# Patient Record
Sex: FEMALE | Race: WHITE
[De-identification: names, ages, dates, MRNs, and addresses within clinical notes are randomized per-mention and may not be internally consistent; named-entity substitution may affect disease eponyms.]

---

## 2020-03-02 ENCOUNTER — HOSPITAL ENCOUNTER (EMERGENCY)
Dept: HOSPITAL 97 - ER | Age: 70
LOS: 1 days | Discharge: HOME | End: 2020-03-03
Payer: COMMERCIAL

## 2020-03-02 DIAGNOSIS — S52.612A: ICD-10-CM
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
## 2020-03-02 DIAGNOSIS — Z88.5: ICD-10-CM
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
## 2020-03-02 DIAGNOSIS — S52.352A: Primary | ICD-10-CM
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
## 2020-03-02 PROCEDURE — 25415 REPAIR RADIUS & ULNA: CPT

## 2020-03-02 PROCEDURE — 96375 TX/PRO/DX INJ NEW DRUG ADDON: CPT

## 2020-03-02 PROCEDURE — 73110 X-RAY EXAM OF WRIST: CPT

## 2020-03-02 PROCEDURE — 96374 THER/PROPH/DIAG INJ IV PUSH: CPT

## 2020-03-02 PROCEDURE — 99285 EMERGENCY DEPT VISIT HI MDM: CPT

## 2020-03-02 PROCEDURE — 73100 X-RAY EXAM OF WRIST: CPT

## 2020-03-03 VITALS — DIASTOLIC BLOOD PRESSURE: 64 MMHG | SYSTOLIC BLOOD PRESSURE: 150 MMHG | OXYGEN SATURATION: 97 %

## 2020-03-03 VITALS — TEMPERATURE: 98.7 F

## 2020-03-03 PROCEDURE — 0PSLXZZ REPOSITION LEFT ULNA, EXTERNAL APPROACH: ICD-10-PCS

## 2020-03-03 PROCEDURE — 0PSJXZZ REPOSITION LEFT RADIUS, EXTERNAL APPROACH: ICD-10-PCS

## 2020-03-03 NOTE — ER
Nurse's Notes                                                                                     

 [facility]                                                                 

Name: Lianne Martinez                                                                                  

Age: 70 yrs                                                                                       

Sex: Female                                                                                       

: 1950                                                                                   

MRN: [id number]                                                                                   

Arrival Date: 2020                                                                          

Time: 20:05                                                                                       

Account#: [id number]                                                                            

Bed 3                                                                                             

Private MD:                                                                                       

Diagnosis: Displaced comminuted fracture of shaft of radius, left arm;Fracture of ulna styloid    

  process                                                                                         

                                                                                                  

Presentation:                                                                                     

                                                                                             

20:15 Chief complaint: Spouse and/or significant other states: She was stepping off the side  jb4 

      walk onto the ground and her ankle gave out and she fell on her wrist.                      

20:15 Coronavirus screen: The patient has NOT traveled to [place] in the past 14 days. Proceed  jb4 

      with normal triage procedures. The patient has NOT had contact with known and/or            

      suspected case of Coronavirus. Proceed with normal triage procedures. Ebola Screen: No      

      symptoms or risks identified at this time. Initial Sepsis Screen: Does the patient meet     

      any 2 criteria? No. Patient's initial sepsis screen is negative. Does the patient have      

      a suspected source of infection? No. Patient's initial sepsis screen is negative. Risk      

      Assessment: Do you want to hurt yourself or someone else? Patient reports no desire to      

      harm self or others.                                                                        

20:15 Method Of Arrival: Ambulatory                                                           jb4 

20:15 Acuity: NILAM 3                                                                           jb4 

                                                                                                  

Historical:                                                                                       

- Allergies:                                                                                      

20:15 Codeine;                                                                                jb4 

- Home Meds:                                                                                      

20:15 Lisinopril Oral [Active];                                                               jb4 

- PMHx:                                                                                           

20:15 None;                                                                                   jb4 

- PSHx:                                                                                           

20:15 ;                                                                              jb4 

                                                                                                  

- Immunization history:: Adult Immunizations up to date, Flu vaccine is not up to date.           

- Social history:: Smoking status: Patient denies any tobacco usage or history of.                

  Patient/guardian denies using alcohol, street drugs.                                            

- Family history:: not pertinent.                                                                 

- Hospitalizations: : No recent hospitalization is reported.                                      

                                                                                                  

                                                                                                  

Screenin:45 Abuse screen: Denies threats or abuse. Denies injuries from another. Nutritional        rr5 

      screening: No deficits noted. Tuberculosis screening: No symptoms or risk factors           

      identified. Fall Risk IV access (20 points).                                                

                                                                                                  

Assessment:                                                                                       

20:15 General: Appears in no apparent distress. uncomfortable, Behavior is calm, cooperative, jb4 

      appropriate for age. Pain: Complains of pain in left wrist Pain radiates to palmar          

      aspect of left forearm Pain currently is 10 out of 10 on a pain scale. Quality of pain      

      is described as throbbing. Neuro: Level of Consciousness is awake, alert, obeys             

      commands, Oriented to person, place, time, situation. Cardiovascular: Capillary refill      

      < 3 seconds in left fingers Patient's skin is warm and dry. Respiratory: Airway is          

      patent Respiratory effort is even, unlabored, Respiratory pattern is regular,               

      symmetrical. GI: No signs and/or symptoms were reported involving the gastrointestinal      

      system. : No signs and/or symptoms were reported regarding the genitourinary system.      

      EENT: No signs and/or symptoms were reported regarding the EENT system. Derm: Skin is       

      intact, Skin is pink, warm \T\ dry. Musculoskeletal: Range of motion: limited in left       

      wrist.                                                                                      

21:15 Reassessment: Patient appears in no apparent distress at this time. Patient and/or      jb4 

      family updated on plan of care and expected duration. Pain level reassessed. Patient is     

      alert, oriented x 3, equal unlabored respirations, skin warm/dry/pink. Patient states       

      feeling better.                                                                             

22:13 Reassessment: Patient appears in no apparent distress at this time. Patient and/or      jb4 

      family updated on plan of care and expected duration. Pain level reassessed. Patient is     

      alert, oriented x 3, equal unlabored respirations, skin warm/dry/pink. Pt reports pain      

      is decreased but starting to come back. Conscious sedation consent form signed.             

23:00 Reassessment: Patient appears in no apparent distress at this time. Patient and/or      jb4 

      family updated on plan of care and expected duration. Pain level reassessed. Patient is     

      alert, oriented x 3, equal unlabored respirations, skin warm/dry/pink. Conscious            

      sedation started, see flow sheet for further documentation.                                 

                                                                                             

00:00 Reassessment: Patient appears in no apparent distress at this time. Patient and/or      jb4 

      family updated on plan of care and expected duration. Pain level reassessed. Patient is     

      alert, oriented x 3, equal unlabored respirations, skin warm/dry/pink.                      

01:00 Reassessment: Patient appears in no apparent distress at this time. Patient and/or      jb4 

      family updated on plan of care and expected duration. Pain level reassessed. Patient is     

      alert, oriented x 3, equal unlabored respirations, skin warm/dry/pink.                      

01:45 Reassessment: Patient appears in no apparent distress at this time. Patient and/or      jb4 

      family updated on plan of care and expected duration. Pain level reassessed. Patient is     

      alert, oriented x 3, equal unlabored respirations, skin warm/dry/pink. Pt reports           

      nausea, provider notified, see MAR for orders.                                              

01:50 Reassessment: Pt and family, verbalized understanding of d/c and follow up              jb4 

      instructions. Assisted out of ED via wheelchair. Cap refill <3 seconds in left fingers.     

      reports pain has decreased. Assisted to vehicle via wheelchair.                             

                                                                                                  

Vital Signs:                                                                                      

                                                                                             

20:15  / 95; Pulse 90; Resp 16; Pulse Ox 97% on R/A; Weight 86.18 kg (R); Height 5 ft.  jb4 

      6 in. (167.64 cm) (R); Pain 10/10;                                                          

21:00  / 70; Pulse 90; Resp 16; Pulse Ox 96% on R/A;                                    jb4 

22:14  / 77; Pulse 101; Resp 16; Temp 98.4(O); Pulse Ox 97% on R/A; Pain 7/10;          jb4 

23:00  / 78; Pulse 107; Resp 16; Temp 98.7; Pulse Ox 96% on R/A;                        jb4 

                                                                                             

00:00  / 64; Pulse 78; Resp 12; Pulse Ox 97% on R/A;                                    jb4 

                                                                                             

20:15 Body Mass Index 30.67 (86.18 kg, 167.64 cm)                                             jb4 

                                                                                             

23:00 Conscious sedation started, see flow sheet for further documentation.                   jb4 

                                                                                                  

ED Course:                                                                                        

20:05 Patient arrived in ED.                                                                  jg7 

20:12 Sterling Polk MD is Attending Physician.                                                rn  

20:15 Arm band placed on right wrist.                                                         jb4 

20:15 Patient has correct armband on for positive identification. Bed in low position. Call   jb4 

      light in reach. Side rails up X 1. Pulse ox on. NIBP on.                                    

20:26 Reggie Fowler, RN is Primary Nurse.                                                     jb4 

20:29 XRAY Wrist LEFT 3 view In Process Unspecified.                                          EDMS

20:30 Triage completed.                                                                       jb4 

20:40 Inserted saline lock: 22 gauge in right forearm, using aseptic technique. ,using        rr5 

      aseptic technique. inserted by mohit Baeza.                                                    

22:14 Ice pack to injury.                                                                     jb4 

23:35 Assist provider with fracture care of left wrist Fracture is closed. Obvious deformity  jb4 

      is noted. Circulation, motor and sensation is intact. Set up for procedure. Performed       

      by Sterling Polk MD Reduced with physical manipulation. Immobilized with Post                 

      immobilization, circulation, motor and sensation remain intact. Patient tolerated well.     

23:51 XRAY Wrist LEFT 2 view In Process Unspecified.                                          EDMS

                                                                                             

01:15 Prakash Dorman MD is Referral Physician.                                                rn  

01:40 IV discontinued, intact, bleeding controlled, No redness/swelling at site. Pressure     jb4 

      dressing applied.                                                                           

                                                                                                  

Administered Medications:                                                                         

                                                                                             

21:00 Drug: Zofran (Ondansetron) 4 mg Route: IVP; Site: right forearm;                        rr5 

21:30 Follow up: Response: No adverse reaction                                                [facility] 

21:02 Drug: morphine 2 mg {Note: rass 0 .} Route: IVP; Site: right forearm;                   rr5 

21:30 Follow up: Response: No adverse reaction; Pain is decreased; RASS: Alert and Calm (0)   [facility] 

21:05 Drug: Pepcid 20 mg Route: IVP; Site: right forearm;                                     rr5 

21:30 Follow up: Response: No adverse reaction                                                [facility] 

23:10 Drug: Propofol 100 mg Route: IVP; Site: right antecubital;                              [facility] 

                                                                                             

00:50 Follow up: Response: No adverse reaction                                                [facility] 

                                                                                             

23:20 Drug: Propofol 100 mg Route: IVP; Site: right antecubital;                              [facility] 

                                                                                             

00:50 Follow up: Response: No adverse reaction                                                [facility] 

                                                                                             

23:25 Drug: fentaNYL (PF) 50 mcg {Note: rass score -1 due to conscious sedation..} Route:     jb4 

      IVP; Site: right antecubital;                                                               

                                                                                             

00:50 Follow up: Response: No adverse reaction; Pain is decreased; RASS: Alert and Calm (0)   [facility] 

01:30 Drug: morphine 4 mg {Note: Rass score 0.} Route: IVP; Site: right antecubital;          4 

01:50 Follow up: Response: No adverse reaction; Pain is decreased; RASS: Alert and Calm (0)   [facility] 

01:40 Drug: Zofran (Ondansetron) 4 mg Route: PO;                                              4 

01:50 Follow up: Response: No adverse reaction                                                jb4 

                                                                                                  

                                                                                                  

Outcome:                                                                                          

01:16 Discharge ordered by MD.                                                                rn  

01:45 Discharged to home via wheelchair, with family.                                         jb4 

01:45 Condition: stable                                                                           

01:45 Discharge instructions given to patient, family, Instructed on discharge instructions,      

      follow up and referral plans. medication usage, Demonstrated understanding of               

      instructions, follow-up care, medications, Prescriptions given X 2.                         

01:52 Patient left the ED.                                                                    mw2 

                                                                                                  

Signatures:                                                                                       

Dispatcher [employer]                           EDMS                                                 

Sterling Polk MD MD rn Bryson, James RN                       RN   jb4                                                  

Alejandro Villanueva                            mw2                                                  

Edouard Carnes RN                      RN   rr5                                                  

Leida Pressleyg7                                                  

                                                                                                  

Corrections: (The following items were deleted from the chart)                                    

02:30 01:40 No provider procedures requiring assistance completed. jb4                        jb4 

                                                                                                  

**************************************************************************************************

## 2020-03-03 NOTE — EDPHYS
Physician Documentation                                                                           

 [facility]                                                                 

Name: Lianne Martinez                                                                                  

Age: 70 yrs                                                                                       

Sex: Female                                                                                       

: 1950                                                                                   

MRN: [id number]                                                                                   

Arrival Date: 2020                                                                          

Time: 20:05                                                                                       

Account#: [id number]                                                                            

Bed 3                                                                                             

Private MD:                                                                                       

ED Physician Sterling Polk                                                                         

HPI:                                                                                              

                                                                                             

00:42 This 70 yrs old  Female presents to ER via Ambulatory with complaints of Arm   rn  

      Injury.                                                                                     

00:42 The patient or guardian complains of deformity, injury, pain. The complaints affect the rn  

      left wrist. Onset: The symptoms/episode began/occurred just prior to arrival. Modifying     

      factors: The symptoms are alleviated by remaining still, the symptoms are aggravated by     

      movement, bending arm. Severity of symptoms: At their worst the symptoms were moderate,     

      in the emergency department the symptoms are unchanged. The patient has experienced a       

      previous episode. Reports fall, mechanical and accidental, + left wrist pain, no other      

      injury. Last PO intake was lunch. .                                                         

                                                                                                  

Historical:                                                                                       

- Allergies:                                                                                      

                                                                                             

20:15 Codeine;                                                                                jb4 

- Home Meds:                                                                                      

20:15 Lisinopril Oral [Active];                                                               jb4 

- PMHx:                                                                                           

20:15 None;                                                                                   jb4 

- PSHx:                                                                                           

20:15 ;                                                                              jb4 

                                                                                                  

- Immunization history:: Adult Immunizations up to date, Flu vaccine is not up to date.           

- Social history:: Smoking status: Patient denies any tobacco usage or history of.                

  Patient/guardian denies using alcohol, street drugs.                                            

- Family history:: not pertinent.                                                                 

- Hospitalizations: : No recent hospitalization is reported.                                      

                                                                                                  

                                                                                                  

ROS:                                                                                              

                                                                                             

00:42 Constitutional: Negative for fever, chills, and weight loss, Neck: Negative for injury, rn  

      pain, and swelling, Cardiovascular: Negative for chest pain, palpitations, and edema,       

      Respiratory: Negative for shortness of breath, cough, wheezing, and pleuritic chest         

      pain, Abdomen/GI: Negative for abdominal pain, nausea, vomiting, diarrhea, and              

      constipation, Back: Negative for injury and pain, MS/Extremity: + left wrist pain and       

      injury Skin: Negative for injury, rash, and discoloration, Neuro: Negative for              

      headache, weakness, numbness, tingling, and seizure.                                        

                                                                                                  

Exam:                                                                                             

00:42 Constitutional:  This is a well developed, well nourished patient who is awake, alert,  rn  

      holding left wrist close to her body Head/Face:  Normocephalic, atraumatic. MS/             

      Extremity:  Pulses equal, no cyanosis.  + mild swelling to left wrist with tenderness       

      over distal radius. NO open wounds. + ecchymosis volar wrist.                               

                                                                                                  

Vital Signs:                                                                                      

                                                                                             

20:15  / 95; Pulse 90; Resp 16; Pulse Ox 97% on R/A; Weight 86.18 kg (R); Height 5 ft.  jb4 

      6 in. (167.64 cm) (R); Pain 10/10;                                                          

21:00  / 70; Pulse 90; Resp 16; Pulse Ox 96% on R/A;                                    jb4 

22:14  / 77; Pulse 101; Resp 16; Temp 98.4(O); Pulse Ox 97% on R/A; Pain 7/10;          jb4 

23:00  / 78; Pulse 107; Resp 16; Temp 98.7; Pulse Ox 96% on R/A;                        4 

                                                                                             

00:00  / 64; Pulse 78; Resp 12; Pulse Ox 97% on R/A;                                    4 

                                                                                             

20:15 Body Mass Index 30.67 (86.18 kg, 167.64 cm)                                             [facility] 

                                                                                             

23:00 Conscious sedation started, see flow sheet for further documentation.                   [facility] 

                                                                                                  

Procedures:                                                                                       

                                                                                             

01:13 Splinting: Splint applied to left wrist using wrist splint, applied by myself. post     rn  

      reduction film - reveals improved alignment, Examined by me, post splint application:       

      neurovascular intact, 2+ distal pulses palpable, brisk capillary refill noted, Patient      

      tolerated well. Reduction: of the left wrist, using traction, manipulation, Immobilized     

      with wrist splint, Patient tolerated well. Post reduction film - reveals improved           

      alignment. Moderate sedation: Pre-procedure assessment: the patient has been NPO 6          

      hour(s) prior to arrival, ASA physical classification: I - healthy, no underlying           

      organic disease, Airway assessment: able to hyperextend neck, able to maintain airway,      

      can open mouth without difficulty, Monitoring during procedure: cardiac monitor,            

      continuous pulse oximetry, nurse at bedside at all times, Medications employed:             

      propofol, Post-procedure assessment: the patient is not sedated, Respiratory status:        

      even and unlabored, a reversal agent was not used.                                          

                                                                                                  

MDM:                                                                                              

                                                                                             

20:12 Patient medically screened.                                                             rn  

22:11 ED course: Consenting for conscious sedation for reduction of left wrist fracture..     rn  

                                                                                             

01:13 Differential diagnosis: closed fracture. Data reviewed: vital signs, nurses notes,      rn  

      radiologic studies, plain films, and as a result, I will discharge patient. Counseling:     

      I had a detailed discussion with the patient and/or guardian regarding: the historical      

      points, exam findings, and any diagnostic results supporting the discharge/admit            

      diagnosis, lab results, radiology results, the need for outpatient follow up, to return     

      to the emergency department if symptoms worsen or persist or if there are any questions     

      or concerns that arise at home. Special discussion: I discussed with the                    

      patient/guardian in detail that at this point there is no indication for admission to       

      the hospital. It is understood, however, that if the symptoms persist or worsen the         

      patient needs to return immediately for re-evaluation.                                      

                                                                                                  

                                                                                             

20:18 Order name: XRAY Wrist LEFT 3 view                                                      rn  

                                                                                             

23:33 Order name: XRAY Wrist LEFT 2 view                                                      rn  

                                                                                             

20:18 Order name: IV Start; Complete Time: 21:13                                              rn  

                                                                                             

20:18 Order name: NPO; Complete Time: 20:20                                                   rn  

                                                                                                  

Administered Medications:                                                                         

                                                                                             

21:00 Drug: Zofran (Ondansetron) 4 mg Route: IVP; Site: right forearm;                        rr5 

21:30 Follow up: Response: No adverse reaction                                                4 

21:02 Drug: morphine 2 mg {Note: rass 0 .} Route: IVP; Site: right forearm;                   rr5 

21:30 Follow up: Response: No adverse reaction; Pain is decreased; RASS: Alert and Calm (0)   [facility] 

21:05 Drug: Pepcid 20 mg Route: IVP; Site: right forearm;                                     rr5 

21:30 Follow up: Response: No adverse reaction                                                [facility] 

23:10 Drug: Propofol 100 mg Route: IVP; Site: right antecubital;                              4 

                                                                                             

00:50 Follow up: Response: No adverse reaction                                                [facility] 

                                                                                             

23:20 Drug: Propofol 100 mg Route: IVP; Site: right antecubital;                              4 

                                                                                             

00:50 Follow up: Response: No adverse reaction                                                [facility] 

                                                                                             

23:25 Drug: fentaNYL (PF) 50 mcg {Note: rass score -1 due to conscious sedation..} Route:     jb4 

      IVP; Site: right antecubital;                                                               

                                                                                             

00:50 Follow up: Response: No adverse reaction; Pain is decreased; RASS: Alert and Calm (0)   4 

01:30 Drug: morphine 4 mg {Note: Rass score 0.} Route: IVP; Site: right antecubital;          jb4 

01:50 Follow up: Response: No adverse reaction; Pain is decreased; RASS: Alert and Calm (0)   4 

01:40 Drug: Zofran (Ondansetron) 4 mg Route: PO;                                               

01:50 Follow up: Response: No adverse reaction                                                4 

                                                                                                  

                                                                                                  

Disposition:                                                                                      

20 01:16 Discharged to Home. Impression: Displaced comminuted fracture of shaft of          

  radius, left arm, Fracture of ulna styloid process.                                             

- Condition is Stable.                                                                            

- Discharge Instructions: Wrist Fracture Treated With Immobilization.                             

- Prescriptions for Zofran ODT 4 mg Oral tablet,disintegrating - place 1 tablet by                

  TRANSLINGUAL route every 8 hours As needed; 20 tablet. Ultram 50 mg Oral Tablet -               

  take 1 tablet by ORAL route every 6 hours As needed; 20 tablet.                                 

- Medication Reconciliation Form, Thank You Letter, Antibiotic Education, Prescription            

  Opioid Use form.                                                                                

- Follow up: Prakash Dorman MD; When: 5 - 6 days; Reason: Recheck today's complaints,             

  Re-evaluation by your physician.                                                                

- Problem is new.                                                                                 

- Symptoms have improved.                                                                         

                                                                                                  

                                                                                                  

                                                                                                  

Signatures:                                                                                       

Dispatcher [employer]                           EDMS                                                 

Sterling Polk MD MD rn Bryson, James, RN                       RN   jb4                                                  

Alejandro Villanueva                            2                                                  

Edouard Carnes RN                      RN   rr5                                                  

                                                                                                  

Corrections: (The following items were deleted from the chart)                                    

01:52 01:16 2020 01:16 Discharged to Home. Impression: Displaced comminuted fracture of mw2 

      shaft of radius, left arm; Fracture of ulna styloid process. Condition is Stable. Forms     

      are Medication Reconciliation Form, Thank You Letter, Antibiotic Education,                 

      Prescription Opioid Use. Follow up: Dr. Prakash Dorman; When: 5 - 6 days; Reason: Recheck     

      today's complaints, Re-evaluation by your physician. Problem is new. Symptoms have          

      improved. rn                                                                                

                                                                                                  

**************************************************************************************************

## 2020-03-03 NOTE — RAD REPORT
EXAM DESCRIPTION:  RAD - Wrist Left 3 View - 3/2/2020 8:33 pm

 

CLINICAL HISTORY:  Pain;Deformity, fall with wrist pain

 

COMPARISON:  No comparisons

 

FINDINGS:  Positioning is not optimal. Patient has fracture dislocation of the distal radius. There i
s dorsal and radial side dislocation of the comminuted distal radial fracture fragments. Ulna styloid
 is fractured and dislocated dorsally. Carpal bone fracture is not identified. Carpal bones maintain 
positioning to the distal radius fracture fragment. No foreign body or other soft tissue abnormality.


 

IMPRESSION:  Comminuted distal radius fracture dislocation as detailed.

 

Ulna styloid fracture dislocation.

## 2020-03-03 NOTE — RAD REPORT
EXAM DESCRIPTION:  RAD - Wrist Left 2 View - 3/2/2020 11:51 pm

 

FINDINGS:  AP and lateral views of the wrist were obtained following reduction maneuvers and placemen
t of cast material.

 

The dislocated distal radius fracture fragment and ulna styloid have been reduced to near anatomic po
sition.

## 2020-03-11 ENCOUNTER — HOSPITAL ENCOUNTER (OUTPATIENT)
Dept: HOSPITAL 97 - OR | Age: 70
Discharge: HOME | End: 2020-03-11
Attending: ORTHOPAEDIC SURGERY
Payer: COMMERCIAL

## 2020-03-11 VITALS — TEMPERATURE: 98.1 F

## 2020-03-11 VITALS — OXYGEN SATURATION: 95 % | SYSTOLIC BLOOD PRESSURE: 125 MMHG | DIASTOLIC BLOOD PRESSURE: 60 MMHG

## 2020-03-11 DIAGNOSIS — I10: ICD-10-CM

## 2020-03-11 DIAGNOSIS — S52.572A: Primary | ICD-10-CM

## 2020-03-11 DIAGNOSIS — Z82.49: ICD-10-CM

## 2020-03-11 DIAGNOSIS — K21.9: ICD-10-CM

## 2020-03-11 DIAGNOSIS — Z82.3: ICD-10-CM

## 2020-03-11 DIAGNOSIS — Z88.6: ICD-10-CM

## 2020-03-11 PROCEDURE — 0PSJ04Z REPOSITION LEFT RADIUS WITH INTERNAL FIXATION DEVICE, OPEN APPROACH: ICD-10-PCS

## 2020-03-11 PROCEDURE — 25609 OPTX DST RD XART FX/EP SEP3+: CPT

## 2020-03-11 PROCEDURE — 73100 X-RAY EXAM OF WRIST: CPT

## 2020-03-11 RX ADMIN — CEFAZOLIN ONE MLS: 1 INJECTION, POWDER, FOR SOLUTION INTRAMUSCULAR; INTRAVENOUS; PARENTERAL at 11:50

## 2020-03-11 RX ADMIN — CEFAZOLIN ONE MLS: 1 INJECTION, POWDER, FOR SOLUTION INTRAMUSCULAR; INTRAVENOUS; PARENTERAL at 10:25

## 2020-03-11 NOTE — XMS REPORT
[facility]

 Created on:2020



Patient:Lianne Martinez

Sex:Female

:1950

External Reference #:[id number]





Demographics







 Address  [address] [place] [address]

 

 Phone  [phone]

 

 Preferred Language  en

 

 Marital Status  Unknown

 

 Pentecostal Affiliation  Unknown

 

 Race  Other Race

 

 Ethnic Group  Unknown









Author







 Organization  [employer]









Care Team Providers







 Name  Role  Phone

 

 Dandy Prakash  Provider Role  Unavailable









Allergies

No Known Allergies



Problems







 Problem Type  Condition  Code  Onset Dates  Condition Status

 

 Problem  Other insomnia not due to a  F51.09    Active



   substance or known physiological      



   condition      

 

 Problem  GERD without esophagitis  K21.9    Active

 

 Problem  Left hip pain  M25.552    Active

 

 Problem  Benign essential HTN  I10    Active







Medications

No Known Medications



Results

No Known Results



Summary Purpose

[employer] Submission

## 2020-03-11 NOTE — XMS REPORT
[facility]

 Created on:March 10, 2020



Patient:Lianne Martinez

Sex:Female

:1950

External Reference #:[id number]





Demographics







 Address  [address] [place] [address]

 

 Phone  [phone]

 

 Preferred Language  en

 

 Marital Status  Unknown

 

 Oriental orthodox Affiliation  Unknown

 

 Race  Other Race

 

 Ethnic Group  Unknown









Author







 Organization  [employer]









Care Team Providers







 Name  Role  Phone

 

 Dandy Prakash  Provider Role  Unavailable









Allergies

No Known Allergies



Problems







 Problem Type  Condition  Code  Onset Dates  Condition Status

 

 Problem  Benign essential HTN  I10    Active

 

 Problem  Essential hypertension  I10    Active

 

 Problem  Closed displaced fracture of  S52.612D    Active



   styloid process of left ulna with      



   routine healing, subsequent      



   encounter      

 

 Problem  Hyperglycemia  R73.9    Active

 

 Problem  Other insomnia not due to a  F51.09    Active



   substance or known physiological      



   condition      

 

 Problem  GERD without esophagitis  K21.9    Active

 

 Problem  Closed fracture of distal end of  S52.502D    Active



   left radius with routine healing,      



   unspecified fracture morphology,      



   subsequent encounter      

 

 Problem  Left hip pain  M25.552    Active







Medications

No Known Medications



Results

No Known Results



Summary Purpose

[employer] Submission

## 2020-03-11 NOTE — RAD REPORT
EXAM DESCRIPTION:  RAD - Wrist Left 2 View - 3/11/2020 1:16 pm

 

CLINICAL HISTORY:  ORIF WITH DOC. LAWSON

Pain

 

COMPARISON:  Wrist Left 2 View dated 3/2/2020; Wrist Left 3 View dated 3/2/2020

 

FINDINGS:  Fluoroscopy time 0.7 minutes.

## 2020-03-11 NOTE — P.BOP
Preoperative diagnosis: left 3 part intraarticular distal radius fracture


Postoperative diagnosis: same


Primary procedure: ORIF left 3 part intraarticular distal radius fracture


Assistant: NONE,NONE


Estimated blood loss: 10 cc


Specimen: none


Findings: see dictation


Anesthesia: General


Complications: None


Implants: 3 hold narrow Acumed distal radius locking plate


Fluids & blood products: per anesthesia record; TT: 60 mins @ 250 mmHg


Transferred to: Recovery Room


Condition: Good

## 2020-03-11 NOTE — RAD REPORT
EXAM DESCRIPTION:  RAD - Wrist Left 2 View - 3/11/2020 1:57 pm

 

CLINICAL HISTORY:  S/P ORIF L DISTAL RADIUS

Pain

 

COMPARISON:  Wrist Left 2 View dated 3/11/2020; Wrist Left 2 View dated 3/2/2020

 

FINDINGS:  Distal radial plate with multiple screws noted. No unexpected immediate postoperative find
ing. Bone detail is mildly limited by cast material.

## 2020-03-11 NOTE — XMS REPORT
[facility]

 Created on:2020



Patient:Lianne Martinez

Sex:Female

:1950

External Reference #:[id number]





Demographics







 Address  [address] [place] [address]

 

 Phone  [phone]

 

 Preferred Language  en

 

 Marital Status  Unknown

 

 Amish Affiliation  Unknown

 

 Race  Other Race

 

 Ethnic Group  Unknown









Author







 Organization  [employer]









Care Team Providers







 Name  Role  Phone

 

 Catie Debbie  Provider Role  Unavailable









Allergies, Adverse Reactions, Alerts







 Substance  Reaction  Event Type

 

 codine  Info Not Available  Drug Allergy

 

 Hydrocodone-Acetaminophen  vomiting  Drug Allergy

 

 Demerol  vomiting  Drug Allergy







Problems







 Problem Type  Condition  Code  Onset Dates  Condition Status

 

 Assessment  Closed fracture of distal end of  S52.502D    Active



   left radius with routine healing,      



   unspecified fracture morphology,      



   subsequent encounter      

 

 Problem  Benign essential HTN  I10    Active

 

 Assessment  Hyperglycemia  R73.9    Active

 

 Assessment  Essential hypertension  I10    Active

 

 Assessment  Closed displaced fracture of  S52.612D    Active



   styloid process of left ulna with      



   routine healing, subsequent      



   encounter      

 

 Problem  Essential hypertension  I10    Active

 

 Problem  Closed displaced fracture of  S52.612D    Active



   styloid process of left ulna with      



   routine healing, subsequent      



   encounter      

 

 Problem  Hyperglycemia  R73.9    Active

 

 Problem  Other insomnia not due to a  F51.09    Active



   substance or known physiological      



   condition      

 

 Problem  GERD without esophagitis  K21.9    Active

 

 Problem  Closed fracture of distal end of  S52.502D    Active



   left radius with routine healing,      



   unspecified fracture morphology,      



   subsequent encounter      

 

 Problem  Left hip pain  M25.552    Active







Medications







 Medication  Code  Code  Instructions  Start  End Date  Status  Dosage



   System      Date      

 

 Lisinopril-Hyd  NDC  53010376078  20-12.5 MG      Active  1 tablet



 rochlorothiazi      Orally Once        



 de      daily        

 

 Ibuprofen  NDC  02105371480  800      Active  TAKE 1



               TABLET BY



               MOUTH



               THREE



               TIMES



               DAILY(



               EVERY



               EIGHT



               HOURS)



               WITH FOOD



               OR MILK



               AS NEEDED







Results







 Name  Result  Date  Reference Range  Unit  Abnormality Flag

 

 HEMOGLOBIN A1C          

 

 ----A1C  5.6%  [id number]      







Summary Purpose

[employer] Submission

## 2020-03-11 NOTE — XMS REPORT
[facility]

 Created on:March 10, 2020



Patient:Lianne Martinez

Sex:Female

:1950

External Reference #:[id number]





Demographics







 Address  [address] [place] [address]

 

 Phone  [phone]

 

 Preferred Language  en

 

 Marital Status  Unknown

 

 Roman Catholic Affiliation  Unknown

 

 Race  Other Race

 

 Ethnic Group  Unknown









Author







 Organization  [employer]









Care Team Providers







 Name  Role  Phone

 

 Dandy Prakash  Provider Role  Unavailable









Allergies

No Known Allergies



Problems







 Problem Type  Condition  Code  Onset Dates  Condition Status

 

 Problem  Benign essential HTN  I10    Active

 

 Problem  Essential hypertension  I10    Active

 

 Problem  Closed displaced fracture of  S52.612D    Active



   styloid process of left ulna with      



   routine healing, subsequent      



   encounter      

 

 Problem  Hyperglycemia  R73.9    Active

 

 Problem  Other insomnia not due to a  F51.09    Active



   substance or known physiological      



   condition      

 

 Problem  GERD without esophagitis  K21.9    Active

 

 Problem  Closed fracture of distal end of  S52.502D    Active



   left radius with routine healing,      



   unspecified fracture morphology,      



   subsequent encounter      

 

 Problem  Left hip pain  M25.552    Active







Medications

No Known Medications



Results

No Known Results



Summary Purpose

[employer] Submission

## 2020-03-12 NOTE — OP
Date of Procedure:  03/11/2020



Surgeon:  Prakash Dorman MD



Preoperative Diagnosis:  Left intraarticular distal radius fracture.



Postoperative Diagnosis:  Left intraarticular distal radius fracture.



Procedure Performed:  Open reduction and internal fixation, left 3-part intraarticular distal radius 
fracture.



Anesthesia:  General LMA.



Fluids:  Per Anesthesia record.



Estimated Blood Loss:  Less than 10 cc.



Tourniquet Time:  60 minutes at 250 mmHg.



Complications:  None.



Implants:  Acumed 3 hole narrow distal radius locking plate.



Indications For Procedure:  Lianne is a 70-year-old female who presented to my clinic after sustaining
 a fall onto an outstretched left hand.  X-rays demonstrated a left intraarticular distal radius frac
ture.  She underwent a closed reduction and internal fixation.  She followed up in my clinic within a
 week and had repeat x-rays which demonstrated significant displacement and angulation of her fractur
e.  I discussed with the patient at length risks and benefits associated with operative and nonoperat
nestor treatment.  Given her significant displacement and intraarticular fracture pattern we elected to 
proceed with operative treatment.



Description Of Procedure:  After informed consent was obtained, the patient was identified in the pre
operative holding area.  The left upper extremity was marked.  Patient underwent a left interscalene 
block to her left upper extremity without complication performed by anesthesia in the PACU prior to [facility].  She was then transferred to the operating room, transferred to the operating table in supine
 fashion and placed under general LMA anesthesia.  The left upper extremity was then prepped and drap
ed in the usual sterile fashion.  A time-out was initiated.  The correct patient and procedure were c
onfirmed and identified.  The patient did receive preoperative prophylactic antibiotics.  The left up
per extremity was exsanguinated using an Esmarch and the tourniquet was inflated to 250 mmHg.  Approx
imately a 10 cm longitudinal incision was made over the FCR tendon [facility]  Dissection was taken down with the FCR tendon sheath, which was split and divided proximally a
nd distally in line with the incision.  The FCR tendon was then retracted radially.  The floor of the
 tendon sheath was then opened using a 15 blade and released proximally and distally.  Blunt dissecti
on was then taken down to the pronator quadratus, which was identified on the radial aspect of the di
stal radius, it was elevated off the distal radius using a 15 blade and a wood handle elevator.  The 
fracture was identified.  It was irrigated with normal saline.  Traction was then placed on the fract
ure and the fracture was reduced and pinned to preliminary hold reduction.  Fluoroscopy was used to e
nsure proper reduction of the fracture.  A 3 hole narrow Acumed distal radius locking plate was then 
placed over the fracture and distal radius.  Proper position of the plate was then confirmed using fl
uoroscopy in both AP and lateral views.  It was fixed to the shaft using a 3.5 cortical screw.  It wa
s then fixed to the distal fracture segment using a cortical screw in a bicortical fashion to help br
ing the fracture to the plate.  Four remaining distal screws were placed within the distal fragment a
nd unicortical with locking screws.  The fracture was to be have intraarticular split which was well 
reduced.  After placement of the locking screws, there was overall good reduction of the fracture not
ed on fluoroscopy.  Two remaining proximal shaft screws were placed in bicortical fashion using 3.5 c
ortical screws.  Final x-rays were taken to ensure proper placement of the plate, proper reduction of
 the fracture.  The wound was then irrigated thoroughly with normal saline.  Subcutaneous tissue was 
approximated using a 2-0 Vicryl.  Skin was approximated using a 3-0 nylon.  Sterile dressings were pl
aced.  Patient was placed in a sugar-tong splint, awakened, and transferred to PACU in stable conditi
on.



Postoperative Plan:  She will be nonweightbearing of her left upper extremity.  She will follow up in
 2 weeks for wound check.  She will 

be placed in a removable wrist brace at that point to begin with gentle range-of-motion exercises.





DORITA/MODL

DD:  03/11/2020 20:44:03Voice ID:  [id number]

DT:  03/12/2020 05:43:17Report ID:  [id number]